# Patient Record
Sex: MALE | Race: WHITE | NOT HISPANIC OR LATINO | URBAN - METROPOLITAN AREA
[De-identification: names, ages, dates, MRNs, and addresses within clinical notes are randomized per-mention and may not be internally consistent; named-entity substitution may affect disease eponyms.]

---

## 2017-08-23 ENCOUNTER — EMERGENCY (EMERGENCY)
Facility: HOSPITAL | Age: 22
LOS: 1 days | Discharge: HOME | End: 2017-08-23
Attending: EMERGENCY MEDICINE | Admitting: EMERGENCY MEDICINE
Payer: COMMERCIAL

## 2017-08-23 VITALS
RESPIRATION RATE: 18 BRPM | HEART RATE: 59 BPM | TEMPERATURE: 98 F | SYSTOLIC BLOOD PRESSURE: 111 MMHG | OXYGEN SATURATION: 100 % | DIASTOLIC BLOOD PRESSURE: 67 MMHG

## 2017-08-23 DIAGNOSIS — K46.9 UNSPECIFIED ABDOMINAL HERNIA WITHOUT OBSTRUCTION OR GANGRENE: Chronic | ICD-10-CM

## 2017-08-23 LAB — S PYO AG SPEC QL IA: NEGATIVE — SIGNIFICANT CHANGE UP

## 2017-08-23 PROCEDURE — 99283 EMERGENCY DEPT VISIT LOW MDM: CPT | Mod: 25

## 2017-08-23 RX ORDER — DEXAMETHASONE 0.5 MG/5ML
10 ELIXIR ORAL ONCE
Qty: 0 | Refills: 0 | Status: COMPLETED | OUTPATIENT
Start: 2017-08-23 | End: 2017-08-23

## 2017-08-23 RX ORDER — ACETAMINOPHEN 500 MG
975 TABLET ORAL ONCE
Qty: 0 | Refills: 0 | Status: COMPLETED | OUTPATIENT
Start: 2017-08-23 | End: 2017-08-23

## 2017-08-23 NOTE — ED PROVIDER NOTE - CARE PLAN
Instructions for follow-up, activity and diet:	You were seen in the Emergency Department for abdominal pain and sore throat. Your examination and lab tests were reassuring. Take tylenol as needed for pain. Please return to the Emergency Department if you have any new concerning symptoms such as severe pain, weakness, shortness of breath or any other concerning symptoms. Principal Discharge DX:	Pharyngitis, unspecified etiology  Instructions for follow-up, activity and diet:	You were seen in the Emergency Department for abdominal pain and sore throat. Your examination and lab tests were reassuring. Take tylenol as needed for pain. Please return to the Emergency Department if you have any new concerning symptoms such as severe pain, weakness, shortness of breath or any other concerning symptoms.  Secondary Diagnosis:	Right lower quadrant abdominal pain

## 2017-08-23 NOTE — ED PROVIDER NOTE - MEDICAL DECISION MAKING DETAILS
20yo male p/w abdominal pain and sore throat since yesterday. Pt. reports pain is RLQ, nonradiating, intermittent, aggravated by coughing, no alleviating factors. Nontender, normal  exam. Will obtain rapid strep, provide tylenol and decadron. 20yo male p/w abdominal pain and sore throat since yesterday. Pt. reports pain is RLQ, nonradiating, intermittent, aggravated by coughing, no alleviating factors. Nontender, normal  exam. Will obtain rapid strep, provide tylenol and decadron.  Return precautions for acute appendicitis given to pt.  pt understands and will return if symptoms worsen

## 2017-08-23 NOTE — ED PROVIDER NOTE - PLAN OF CARE
You were seen in the Emergency Department for abdominal pain and sore throat. Your examination and lab tests were reassuring. Take tylenol as needed for pain. Please return to the Emergency Department if you have any new concerning symptoms such as severe pain, weakness, shortness of breath or any other concerning symptoms.

## 2017-08-23 NOTE — ED PROVIDER NOTE - OBJECTIVE STATEMENT
20yo male p/w abdominal pain and sore throat since yesterday. Pt. reports pain is RLQ, nonradiating, intermittent, aggravated by coughing, no alleviating factors. Pt. taking aleve w/ minimal relief. Pt. reports subjective fevers yesterday. Pt. reports sore throat since yesterday. Denies n/v/d, dysuria, hematuria, trauma. Pt. reports heavy lifting at work(works at [employer]). 22yo male p/w abdominal pain and sore throat since yesterday. Pt. reports pain is RLQ, nonradiating, intermittent, aggravated by coughing, no alleviating factors. Pt. taking aleve w/ minimal relief. Pt. reports subjective fevers yesterday. Pt. reports sore throat since yesterday. Denies n/v/d, dysuria, hematuria, trauma. Pt. reports heavy lifting at work(works at [employer]).    Attendinyo male presents with sore throat and RLQ pain for 2 days.  Pain is worse with movement sometimes but not always.  No nausea or vomiting.  No fever.  Tolerating PO well.

## 2017-08-24 PROCEDURE — 99283 EMERGENCY DEPT VISIT LOW MDM: CPT

## 2017-08-24 PROCEDURE — 87081 CULTURE SCREEN ONLY: CPT

## 2017-08-24 PROCEDURE — 87880 STREP A ASSAY W/OPTIC: CPT

## 2017-08-24 RX ADMIN — Medication 975 MILLIGRAM(S): at 00:06

## 2017-08-24 RX ADMIN — Medication 10 MILLIGRAM(S): at 00:06

## 2020-02-20 ENCOUNTER — APPOINTMENT (OUTPATIENT)
Dept: GASTROENTEROLOGY | Facility: CLINIC | Age: 25
End: 2020-02-20

## 2020-03-05 ENCOUNTER — APPOINTMENT (OUTPATIENT)
Dept: GASTROENTEROLOGY | Facility: CLINIC | Age: 25
End: 2020-03-05
Payer: COMMERCIAL

## 2020-03-05 VITALS
DIASTOLIC BLOOD PRESSURE: 68 MMHG | SYSTOLIC BLOOD PRESSURE: 108 MMHG | OXYGEN SATURATION: 98 % | WEIGHT: 168 LBS | TEMPERATURE: 98.4 F | BODY MASS INDEX: 23.52 KG/M2 | HEIGHT: 71 IN | HEART RATE: 80 BPM

## 2020-03-05 DIAGNOSIS — Z78.9 OTHER SPECIFIED HEALTH STATUS: ICD-10-CM

## 2020-03-05 DIAGNOSIS — Z87.19 PERSONAL HISTORY OF OTHER DISEASES OF THE DIGESTIVE SYSTEM: ICD-10-CM

## 2020-03-05 PROCEDURE — 99203 OFFICE O/P NEW LOW 30 MIN: CPT

## 2020-03-05 RX ORDER — IBUPROFEN 200 MG/1
TABLET, COATED ORAL
Refills: 0 | Status: ACTIVE | COMMUNITY

## 2020-03-05 RX ORDER — MULTIVITAMIN
TABLET ORAL
Refills: 0 | Status: ACTIVE | COMMUNITY

## 2020-03-05 NOTE — ASSESSMENT
[FreeTextEntry1] : Impression and plan\par \par Patient presents to the office today with irritable bowel type symptoms I will recommend further testing in the form of colonoscopy. The risks benefits alternatives and limitations were discussed. I asked the patient to maintain a food and symptom diary for review at next visit. Additionally I asked patient to perform a corn test for GI motility patient will update me as we go and schedule at his convenience.

## 2020-03-05 NOTE — HISTORY OF PRESENT ILLNESS
[FreeTextEntry1] : Patient came to the office today accompanied by his mother who is a nurse with complaints of irritable bowel type symptoms. Patient is a 24-year-old male who has been experiencing alternating constipation and diarrhea with crampy lower quadrant abdominal pain. He is a self-described type A personality who is very self motivated. He has a busy schedule and stressful deadlines ahead of him. He believes that this may contribute to his symptoms. Patient currently denies nausea vomiting fever chills rectal bleeding or melena. He does have alternating constipation and diarrhea and has been using MiraLax and fiber with partial relief.

## 2020-08-05 DIAGNOSIS — Z01.818 ENCOUNTER FOR OTHER PREPROCEDURAL EXAMINATION: ICD-10-CM

## 2020-08-05 DIAGNOSIS — Z11.59 ENCOUNTER FOR SCREENING FOR OTHER VIRAL DISEASES: ICD-10-CM

## 2020-08-08 LAB — SARS-COV-2 N GENE NPH QL NAA+PROBE: NOT DETECTED

## 2020-08-11 ENCOUNTER — APPOINTMENT (OUTPATIENT)
Dept: GASTROENTEROLOGY | Facility: AMBULATORY MEDICAL SERVICES | Age: 25
End: 2020-08-11
Payer: COMMERCIAL

## 2020-08-11 PROCEDURE — 45380 COLONOSCOPY AND BIOPSY: CPT

## 2020-08-11 PROCEDURE — 43239 EGD BIOPSY SINGLE/MULTIPLE: CPT

## 2020-12-21 ENCOUNTER — NON-APPOINTMENT (OUTPATIENT)
Age: 25
End: 2020-12-21

## 2020-12-21 ENCOUNTER — APPOINTMENT (OUTPATIENT)
Dept: INTERNAL MEDICINE | Facility: CLINIC | Age: 25
End: 2020-12-21
Payer: COMMERCIAL

## 2020-12-21 ENCOUNTER — LABORATORY RESULT (OUTPATIENT)
Age: 25
End: 2020-12-21

## 2020-12-21 VITALS
DIASTOLIC BLOOD PRESSURE: 74 MMHG | TEMPERATURE: 96.4 F | BODY MASS INDEX: 25.48 KG/M2 | WEIGHT: 178 LBS | HEIGHT: 70 IN | SYSTOLIC BLOOD PRESSURE: 113 MMHG | HEART RATE: 79 BPM

## 2020-12-21 PROCEDURE — 99385 PREV VISIT NEW AGE 18-39: CPT | Mod: 25

## 2020-12-21 PROCEDURE — 36415 COLL VENOUS BLD VENIPUNCTURE: CPT

## 2020-12-21 PROCEDURE — 93000 ELECTROCARDIOGRAM COMPLETE: CPT

## 2020-12-21 PROCEDURE — 99072 ADDL SUPL MATRL&STAF TM PHE: CPT

## 2020-12-21 NOTE — PHYSICAL EXAM
[Normal] : normal rate, regular rhythm, normal S1 and S2 and no murmur heard [No Varicosities] : no varicosities [Pedal Pulses Present] : the pedal pulses are present [No Edema] : there was no peripheral edema [No Extremity Clubbing/Cyanosis] : no extremity clubbing/cyanosis [Soft] : abdomen soft [Non Tender] : non-tender [Non-distended] : non-distended [No Masses] : no abdominal mass palpated [Normal Bowel Sounds] : normal bowel sounds [Normal Posterior Cervical Nodes] : no posterior cervical lymphadenopathy [Normal Anterior Cervical Nodes] : no anterior cervical lymphadenopathy [No CVA Tenderness] : no CVA  tenderness [No Spinal Tenderness] : no spinal tenderness [No Joint Swelling] : no joint swelling [Grossly Normal Strength/Tone] : grossly normal strength/tone [No Rash] : no rash [Coordination Grossly Intact] : coordination grossly intact [No Focal Deficits] : no focal deficits [Normal Gait] : normal gait [Normal Affect] : the affect was normal [Normal Mood] : the mood was normal

## 2020-12-21 NOTE — ASSESSMENT
[FreeTextEntry1] : 24 year old male presents for initial annual exam.\par \par h/o GERD on omeprazole since last year.  follows with GI,  s/p EGD and colonoscopy which were normal.  \par \par \par c/o decreased appetite, fatigue and decreased exercise tolerance for the last 2 weeks.  no fever/chills, n/v, sob, chest pain or palpitations.  with abnormal ecg, multiple diffuse twave inversions.  d/w cardio, Dr Donovan, consistent with pericarditis.  \par -will have cardio evaluation tomorrow and likely echo\par -may take some nsaids for now, may start colchicine if needed \par -will check labs including esr, crp and mono\par \par Annual \par -Advised to get yearly Flu shot -will hold off for now given possible underlying viral infection \par -Advised Yearly Eye exam with Ophthalmologist\par -Advised Yearly Dental exam\par -Educated of the importance of Healthy diet, such as Mediterranean Diet and Exercise, such as walking >20 minutes a day and increasing gradually as tolerated\par -Educated on the importance of limiting alcohol use to less than 5 drinks per week and refraining from tobacco and drug use.  discussed the need to use condoms to decrease risk of zachariah sexually transmitted disease\par \par \par \par \par

## 2020-12-21 NOTE — HISTORY OF PRESENT ILLNESS
[de-identified] : 24 year old male presents for initial annual exam.\par \par h/o GERD on omeprazole since last year.  follows with GI,  s/p EGD and colonoscopy which were normal.  \par \par c/o decreased appetite, fatigue and decreased exercise tolerance for the last 2 weeks.  no fever/chills, n/v, sob, chest pain or palpitations.  \par \par does occasionally have diarrhea and constipation from his IBS?.  no melena, abdominal pain\par \par \par

## 2020-12-21 NOTE — HEALTH RISK ASSESSMENT
[Good] : ~his/her~  mood as  good [No] : In the past 12 months have you used drugs other than those required for medical reasons? No [0] : 2) Feeling down, depressed, or hopeless: Not at all (0) [HIV Test offered] : HIV Test offered [Hepatitis C test offered] : Hepatitis C test offered [With Family] : lives with family [Employed] : employed [Single] : single [Sexually Active] : sexually active [Fully functional (bathing, dressing, toileting, transferring, walking, feeding)] : Fully functional (bathing, dressing, toileting, transferring, walking, feeding) [Fully functional (using the telephone, shopping, preparing meals, housekeeping, doing laundry, using] : Fully functional and needs no help or supervision to perform IADLs (using the telephone, shopping, preparing meals, housekeeping, doing laundry, using transportation, managing medications and managing finances) [] : No [AAQ1Iisfs] : 0 [Change in mental status noted] : No change in mental status noted [High Risk Behavior] : no high risk behavior [Reports changes in hearing] : Reports no changes in hearing [Reports changes in vision] : Reports no changes in vision [Reports changes in dental health] : Reports no changes in dental health [de-identified] : uses condoms

## 2020-12-22 ENCOUNTER — APPOINTMENT (OUTPATIENT)
Dept: CV DIAGNOSITCS | Facility: HOSPITAL | Age: 25
End: 2020-12-22

## 2020-12-22 ENCOUNTER — APPOINTMENT (OUTPATIENT)
Dept: CARDIOLOGY | Facility: CLINIC | Age: 25
End: 2020-12-22
Payer: COMMERCIAL

## 2020-12-22 ENCOUNTER — OUTPATIENT (OUTPATIENT)
Dept: OUTPATIENT SERVICES | Facility: HOSPITAL | Age: 25
LOS: 1 days | End: 2020-12-22
Payer: COMMERCIAL

## 2020-12-22 ENCOUNTER — NON-APPOINTMENT (OUTPATIENT)
Age: 25
End: 2020-12-22

## 2020-12-22 ENCOUNTER — APPOINTMENT (OUTPATIENT)
Dept: INTERNAL MEDICINE | Facility: CLINIC | Age: 25
End: 2020-12-22

## 2020-12-22 VITALS
SYSTOLIC BLOOD PRESSURE: 127 MMHG | HEART RATE: 77 BPM | WEIGHT: 181 LBS | TEMPERATURE: 97.2 F | HEIGHT: 70 IN | BODY MASS INDEX: 25.91 KG/M2 | OXYGEN SATURATION: 97 % | DIASTOLIC BLOOD PRESSURE: 77 MMHG | RESPIRATION RATE: 14 BRPM

## 2020-12-22 DIAGNOSIS — R53.83 OTHER MALAISE: ICD-10-CM

## 2020-12-22 DIAGNOSIS — K46.9 UNSPECIFIED ABDOMINAL HERNIA WITHOUT OBSTRUCTION OR GANGRENE: Chronic | ICD-10-CM

## 2020-12-22 DIAGNOSIS — R53.81 OTHER MALAISE: ICD-10-CM

## 2020-12-22 DIAGNOSIS — R94.31 ABNORMAL ELECTROCARDIOGRAM [ECG] [EKG]: ICD-10-CM

## 2020-12-22 PROCEDURE — 99205 OFFICE O/P NEW HI 60 MIN: CPT

## 2020-12-22 PROCEDURE — 93000 ELECTROCARDIOGRAM COMPLETE: CPT

## 2020-12-22 PROCEDURE — 93306 TTE W/DOPPLER COMPLETE: CPT | Mod: 26

## 2020-12-22 PROCEDURE — 99072 ADDL SUPL MATRL&STAF TM PHE: CPT

## 2020-12-23 LAB
25(OH)D3 SERPL-MCNC: 27 NG/ML
ALBUMIN SERPL ELPH-MCNC: 4.8 G/DL
ALP BLD-CCNC: 68 U/L
ALT SERPL-CCNC: 37 U/L
ANACR T: NEGATIVE
ANION GAP SERPL CALC-SCNC: 19 MMOL/L
APPEARANCE: CLEAR
AST SERPL-CCNC: 19 U/L
B BURGDOR IGG+IGM SER QL IB: NORMAL
BACTERIA: NEGATIVE
BASOPHILS # BLD AUTO: 0.05 K/UL
BASOPHILS NFR BLD AUTO: 0.8 %
BILIRUB DIRECT SERPL-MCNC: 0.1 MG/DL
BILIRUB INDIRECT SERPL-MCNC: NORMAL MG/DL
BILIRUB SERPL-MCNC: 0.2 MG/DL
BILIRUBIN URINE: NEGATIVE
BLOOD URINE: NEGATIVE
BUN SERPL-MCNC: 18 MG/DL
C TRACH RRNA SPEC QL NAA+PROBE: NOT DETECTED
CALCIUM SERPL-MCNC: 9.8 MG/DL
CHLORIDE SERPL-SCNC: 106 MMOL/L
CHOLEST SERPL-MCNC: 171 MG/DL
CO2 SERPL-SCNC: 17 MMOL/L
COLOR: NORMAL
CREAT SERPL-MCNC: 1.1 MG/DL
CRP SERPL-MCNC: 0.1 MG/DL
EOSINOPHIL # BLD AUTO: 0.29 K/UL
EOSINOPHIL NFR BLD AUTO: 4.6 %
ERYTHROCYTE [SEDIMENTATION RATE] IN BLOOD BY WESTERGREN METHOD: 7 MM/HR
ESTIMATED AVERAGE GLUCOSE: 103 MG/DL
FERRITIN SERPL-MCNC: 133 NG/ML
FOLATE SERPL-MCNC: 14.9 NG/ML
GLUCOSE QUALITATIVE U: NEGATIVE
GLUCOSE SERPL-MCNC: 93 MG/DL
HBA1C MFR BLD HPLC: 5.2 %
HBV CORE IGG+IGM SER QL: NONREACTIVE
HBV CORE IGM SER QL: NONREACTIVE
HBV SURFACE AB SER QL: NONREACTIVE
HBV SURFACE AG SER QL: NONREACTIVE
HCT VFR BLD CALC: 49.4 %
HCV AB SER QL: NONREACTIVE
HCV S/CO RATIO: 0.12 S/CO
HDLC SERPL-MCNC: 44 MG/DL
HETEROPH AB SER QL: NEGATIVE
HGB BLD-MCNC: 15.5 G/DL
HIV1+2 AB SPEC QL IA.RAPID: NONREACTIVE
HSV 1+2 IGG SER IA-IMP: NEGATIVE
HSV 1+2 IGG SER IA-IMP: NEGATIVE
HSV1 IGG SER QL: 0.23 INDEX
HSV2 IGG SER QL: 0.11 INDEX
HYALINE CASTS: 0 /LPF
IMM GRANULOCYTES NFR BLD AUTO: 0.2 %
IRON SATN MFR SERPL: 16 %
IRON SERPL-MCNC: 52 UG/DL
KETONES URINE: NEGATIVE
LDLC SERPL CALC-MCNC: 118 MG/DL
LEUKOCYTE ESTERASE URINE: NEGATIVE
LYMPHOCYTES # BLD AUTO: 2.05 K/UL
LYMPHOCYTES NFR BLD AUTO: 32.4 %
MAN DIFF?: NORMAL
MCHC RBC-ENTMCNC: 28 PG
MCHC RBC-ENTMCNC: 31.4 GM/DL
MCV RBC AUTO: 89.2 FL
MICROSCOPIC-UA: NORMAL
MONOCYTES # BLD AUTO: 0.57 K/UL
MONOCYTES NFR BLD AUTO: 9 %
N GONORRHOEA RRNA SPEC QL NAA+PROBE: NOT DETECTED
NEUTROPHILS # BLD AUTO: 3.35 K/UL
NEUTROPHILS NFR BLD AUTO: 53 %
NITRITE URINE: NEGATIVE
NONHDLC SERPL-MCNC: 127 MG/DL
PH URINE: 6.5
PLATELET # BLD AUTO: 185 K/UL
POTASSIUM SERPL-SCNC: 4.3 MMOL/L
PROT SERPL-MCNC: 6.8 G/DL
PROTEIN URINE: NEGATIVE
RBC # BLD: 5.54 M/UL
RBC # FLD: 14 %
RED BLOOD CELLS URINE: 0 /HPF
SARS-COV-2 IGG SERPL IA-ACNC: 0.06 INDEX
SARS-COV-2 IGG SERPL QL IA: NEGATIVE
SAVE SPECIMEN: NORMAL
SODIUM SERPL-SCNC: 142 MMOL/L
SOURCE AMPLIFICATION: NORMAL
SPECIFIC GRAVITY URINE: 1.03
SQUAMOUS EPITHELIAL CELLS: 0 /HPF
T PALLIDUM AB SER QL IA: NEGATIVE
TIBC SERPL-MCNC: 330 UG/DL
TRIGL SERPL-MCNC: 46 MG/DL
TSH SERPL-ACNC: 2.04 UIU/ML
UIBC SERPL-MCNC: 277 UG/DL
URATE SERPL-MCNC: 5.4 MG/DL
UROBILINOGEN URINE: NORMAL
VIT B12 SERPL-MCNC: 469 PG/ML
WBC # FLD AUTO: 6.32 K/UL
WHITE BLOOD CELLS URINE: 0 /HPF

## 2020-12-27 PROBLEM — R53.81 MALAISE AND FATIGUE: Status: ACTIVE | Noted: 2020-12-21

## 2021-01-19 ENCOUNTER — OUTPATIENT (OUTPATIENT)
Dept: OUTPATIENT SERVICES | Facility: HOSPITAL | Age: 26
LOS: 1 days | End: 2021-01-19
Payer: COMMERCIAL

## 2021-01-19 ENCOUNTER — APPOINTMENT (OUTPATIENT)
Dept: CARDIOLOGY | Facility: CLINIC | Age: 26
End: 2021-01-19

## 2021-01-19 DIAGNOSIS — R94.31 ABNORMAL ELECTROCARDIOGRAM [ECG] [EKG]: ICD-10-CM

## 2021-01-19 DIAGNOSIS — I51.9 HEART DISEASE, UNSPECIFIED: ICD-10-CM

## 2021-01-19 DIAGNOSIS — R07.9 CHEST PAIN, UNSPECIFIED: ICD-10-CM

## 2021-01-19 DIAGNOSIS — K46.9 UNSPECIFIED ABDOMINAL HERNIA WITHOUT OBSTRUCTION OR GANGRENE: Chronic | ICD-10-CM

## 2021-01-19 PROCEDURE — 75574 CT ANGIO HRT W/3D IMAGE: CPT | Mod: 26

## 2021-01-19 PROCEDURE — 75574 CT ANGIO HRT W/3D IMAGE: CPT

## 2021-03-24 ENCOUNTER — APPOINTMENT (OUTPATIENT)
Dept: CARDIOLOGY | Facility: CLINIC | Age: 26
End: 2021-03-24
Payer: COMMERCIAL

## 2021-03-24 VITALS
HEIGHT: 71 IN | RESPIRATION RATE: 16 BRPM | SYSTOLIC BLOOD PRESSURE: 112 MMHG | HEART RATE: 90 BPM | DIASTOLIC BLOOD PRESSURE: 72 MMHG | OXYGEN SATURATION: 97 % | BODY MASS INDEX: 25.48 KG/M2 | TEMPERATURE: 98 F | WEIGHT: 182 LBS

## 2021-03-24 PROCEDURE — 93000 ELECTROCARDIOGRAM COMPLETE: CPT

## 2021-03-24 PROCEDURE — 99214 OFFICE O/P EST MOD 30 MIN: CPT

## 2021-03-24 PROCEDURE — 99072 ADDL SUPL MATRL&STAF TM PHE: CPT

## 2021-03-30 ENCOUNTER — OUTPATIENT (OUTPATIENT)
Dept: OUTPATIENT SERVICES | Facility: HOSPITAL | Age: 26
LOS: 1 days | End: 2021-03-30

## 2021-03-30 ENCOUNTER — APPOINTMENT (OUTPATIENT)
Dept: CV DIAGNOSITCS | Facility: HOSPITAL | Age: 26
End: 2021-03-30
Payer: COMMERCIAL

## 2021-03-30 DIAGNOSIS — R94.31 ABNORMAL ELECTROCARDIOGRAM [ECG] [EKG]: ICD-10-CM

## 2021-03-30 DIAGNOSIS — K46.9 UNSPECIFIED ABDOMINAL HERNIA WITHOUT OBSTRUCTION OR GANGRENE: Chronic | ICD-10-CM

## 2021-03-30 PROCEDURE — 93306 TTE W/DOPPLER COMPLETE: CPT | Mod: 26

## 2021-08-12 ENCOUNTER — EMERGENCY (EMERGENCY)
Facility: HOSPITAL | Age: 26
LOS: 1 days | Discharge: HOME | End: 2021-08-12
Attending: EMERGENCY MEDICINE
Payer: COMMERCIAL

## 2021-08-12 VITALS
SYSTOLIC BLOOD PRESSURE: 121 MMHG | OXYGEN SATURATION: 95 % | HEIGHT: 71 IN | DIASTOLIC BLOOD PRESSURE: 80 MMHG | RESPIRATION RATE: 18 BRPM | TEMPERATURE: 98 F | HEART RATE: 98 BPM | WEIGHT: 175.05 LBS

## 2021-08-12 DIAGNOSIS — K46.9 UNSPECIFIED ABDOMINAL HERNIA WITHOUT OBSTRUCTION OR GANGRENE: Chronic | ICD-10-CM

## 2021-08-12 LAB
ALBUMIN SERPL ELPH-MCNC: 4.8 G/DL — SIGNIFICANT CHANGE UP (ref 3.3–5)
ALP SERPL-CCNC: 63 U/L — SIGNIFICANT CHANGE UP (ref 40–120)
ALT FLD-CCNC: 33 U/L — SIGNIFICANT CHANGE UP (ref 10–45)
ANION GAP SERPL CALC-SCNC: 14 MMOL/L — SIGNIFICANT CHANGE UP (ref 5–17)
APPEARANCE UR: CLEAR — SIGNIFICANT CHANGE UP
AST SERPL-CCNC: 21 U/L — SIGNIFICANT CHANGE UP (ref 10–40)
BILIRUB SERPL-MCNC: 0.5 MG/DL — SIGNIFICANT CHANGE UP (ref 0.2–1.2)
BILIRUB UR-MCNC: NEGATIVE — SIGNIFICANT CHANGE UP
BUN SERPL-MCNC: 15 MG/DL — SIGNIFICANT CHANGE UP (ref 7–23)
CALCIUM SERPL-MCNC: 10.6 MG/DL — HIGH (ref 8.4–10.5)
CHLORIDE SERPL-SCNC: 104 MMOL/L — SIGNIFICANT CHANGE UP (ref 96–108)
CO2 SERPL-SCNC: 23 MMOL/L — SIGNIFICANT CHANGE UP (ref 22–31)
COLOR SPEC: YELLOW — SIGNIFICANT CHANGE UP
CREAT SERPL-MCNC: 0.97 MG/DL — SIGNIFICANT CHANGE UP (ref 0.5–1.3)
DIFF PNL FLD: NEGATIVE — SIGNIFICANT CHANGE UP
GLUCOSE SERPL-MCNC: 93 MG/DL — SIGNIFICANT CHANGE UP (ref 70–99)
GLUCOSE UR QL: NEGATIVE — SIGNIFICANT CHANGE UP
HCT VFR BLD CALC: 49 % — SIGNIFICANT CHANGE UP (ref 39–50)
HGB BLD-MCNC: 16.2 G/DL — SIGNIFICANT CHANGE UP (ref 13–17)
KETONES UR-MCNC: NEGATIVE — SIGNIFICANT CHANGE UP
LEUKOCYTE ESTERASE UR-ACNC: NEGATIVE — SIGNIFICANT CHANGE UP
LIDOCAIN IGE QN: 25 U/L — SIGNIFICANT CHANGE UP (ref 7–60)
MCHC RBC-ENTMCNC: 28.2 PG — SIGNIFICANT CHANGE UP (ref 27–34)
MCHC RBC-ENTMCNC: 33.1 GM/DL — SIGNIFICANT CHANGE UP (ref 32–36)
MCV RBC AUTO: 85.2 FL — SIGNIFICANT CHANGE UP (ref 80–100)
NITRITE UR-MCNC: NEGATIVE — SIGNIFICANT CHANGE UP
NRBC # BLD: 0 /100 WBCS — SIGNIFICANT CHANGE UP (ref 0–0)
PH UR: 6.5 — SIGNIFICANT CHANGE UP (ref 5–8)
PLATELET # BLD AUTO: 215 K/UL — SIGNIFICANT CHANGE UP (ref 150–400)
POTASSIUM SERPL-MCNC: 4 MMOL/L — SIGNIFICANT CHANGE UP (ref 3.5–5.3)
POTASSIUM SERPL-SCNC: 4 MMOL/L — SIGNIFICANT CHANGE UP (ref 3.5–5.3)
PROT SERPL-MCNC: 7.7 G/DL — SIGNIFICANT CHANGE UP (ref 6–8.3)
PROT UR-MCNC: SIGNIFICANT CHANGE UP
RBC # BLD: 5.75 M/UL — SIGNIFICANT CHANGE UP (ref 4.2–5.8)
RBC # FLD: 13.4 % — SIGNIFICANT CHANGE UP (ref 10.3–14.5)
SODIUM SERPL-SCNC: 141 MMOL/L — SIGNIFICANT CHANGE UP (ref 135–145)
SP GR SPEC: 1.03 — HIGH (ref 1.01–1.02)
UROBILINOGEN FLD QL: NEGATIVE — SIGNIFICANT CHANGE UP
WBC # BLD: 13.86 K/UL — HIGH (ref 3.8–10.5)
WBC # FLD AUTO: 13.86 K/UL — HIGH (ref 3.8–10.5)

## 2021-08-12 PROCEDURE — 99284 EMERGENCY DEPT VISIT MOD MDM: CPT

## 2021-08-12 PROCEDURE — 81003 URINALYSIS AUTO W/O SCOPE: CPT

## 2021-08-12 PROCEDURE — 85027 COMPLETE CBC AUTOMATED: CPT

## 2021-08-12 PROCEDURE — 83690 ASSAY OF LIPASE: CPT

## 2021-08-12 PROCEDURE — 99284 EMERGENCY DEPT VISIT MOD MDM: CPT | Mod: 25

## 2021-08-12 PROCEDURE — 96375 TX/PRO/DX INJ NEW DRUG ADDON: CPT

## 2021-08-12 PROCEDURE — 96361 HYDRATE IV INFUSION ADD-ON: CPT

## 2021-08-12 PROCEDURE — 96374 THER/PROPH/DIAG INJ IV PUSH: CPT

## 2021-08-12 PROCEDURE — 80053 COMPREHEN METABOLIC PANEL: CPT

## 2021-08-12 RX ORDER — PANTOPRAZOLE SODIUM 20 MG/1
40 TABLET, DELAYED RELEASE ORAL ONCE
Refills: 0 | Status: COMPLETED | OUTPATIENT
Start: 2021-08-12 | End: 2021-08-12

## 2021-08-12 RX ORDER — FAMOTIDINE 10 MG/ML
20 INJECTION INTRAVENOUS ONCE
Refills: 0 | Status: COMPLETED | OUTPATIENT
Start: 2021-08-12 | End: 2021-08-12

## 2021-08-12 RX ORDER — SODIUM CHLORIDE 9 MG/ML
1000 INJECTION INTRAMUSCULAR; INTRAVENOUS; SUBCUTANEOUS ONCE
Refills: 0 | Status: COMPLETED | OUTPATIENT
Start: 2021-08-12 | End: 2021-08-12

## 2021-08-12 RX ORDER — ONDANSETRON 8 MG/1
4 TABLET, FILM COATED ORAL ONCE
Refills: 0 | Status: COMPLETED | OUTPATIENT
Start: 2021-08-12 | End: 2021-08-12

## 2021-08-12 RX ORDER — ACETAMINOPHEN 500 MG
650 TABLET ORAL ONCE
Refills: 0 | Status: COMPLETED | OUTPATIENT
Start: 2021-08-12 | End: 2021-08-12

## 2021-08-12 RX ADMIN — SODIUM CHLORIDE 1000 MILLILITER(S): 9 INJECTION INTRAMUSCULAR; INTRAVENOUS; SUBCUTANEOUS at 14:01

## 2021-08-12 RX ADMIN — SODIUM CHLORIDE 1000 MILLILITER(S): 9 INJECTION INTRAMUSCULAR; INTRAVENOUS; SUBCUTANEOUS at 15:14

## 2021-08-12 RX ADMIN — FAMOTIDINE 20 MILLIGRAM(S): 10 INJECTION INTRAVENOUS at 13:58

## 2021-08-12 RX ADMIN — SODIUM CHLORIDE 1000 MILLILITER(S): 9 INJECTION INTRAMUSCULAR; INTRAVENOUS; SUBCUTANEOUS at 15:07

## 2021-08-12 RX ADMIN — Medication 650 MILLIGRAM(S): at 15:13

## 2021-08-12 RX ADMIN — Medication 30 MILLILITER(S): at 13:59

## 2021-08-12 RX ADMIN — PANTOPRAZOLE SODIUM 40 MILLIGRAM(S): 20 TABLET, DELAYED RELEASE ORAL at 13:59

## 2021-08-12 RX ADMIN — ONDANSETRON 4 MILLIGRAM(S): 8 TABLET, FILM COATED ORAL at 13:59

## 2021-08-12 NOTE — ED PROVIDER NOTE - NS ED ROS FT
CONST: no fevers, no chills, no trauma  CV: no chest pain, no palpitations, no orthopnea, no extremity pain or swelling  RESP: no shortness of breath, no cough, no sputum, no pleurisy, no wheezing  : no dysuria, no hematuria, no frequency, no urgency  MSK: no back pain, no neck pain, no extremity pain  NEURO: no headache, no sensory disturbances, no focal weakness, no dizziness  HEME: no easy bleeding or bruising  SKIN: no diaphoresis, no rash

## 2021-08-12 NOTE — ED PROVIDER NOTE - PHYSICAL EXAMINATION
xiao   aaox3 nad ncat perrl   no sceral icterus   s1s2 rrr  ctabl   soft nt nd no hernia   no cvat, no testicular ttp =   no rash   cn2-12intact

## 2021-08-12 NOTE — ED PROVIDER NOTE - CLINICAL SUMMARY MEDICAL DECISION MAKING FREE TEXT BOX
xiao 25 male w ho gastritis presents with crampy abd pain x 1 day worse luq - n/v x 1 nonbilious mucous loose stool - abd benign no rebound no guarding no hernia no test ttp= nml lie -- pt w ho of gastritis -- ck lipase and lfts , gi cocktail;, no imaging at this time

## 2021-08-12 NOTE — ED ADULT NURSE NOTE - OBJECTIVE STATEMENT
25 yr old male with past h/o pericarditis and recovered also with h/o gi issues with endoscopy and colonoscopy complete just shows stomach lining is effected. ate lunch yesterday but no dinner as he started to feel nauseous. has diarrhea this am. states it is on the left side mostly but is everywhere. otherwise healthy. on assessment a and o x 3 lungs clear abd soft non tender no swelling in extremities no some nausea no vomiting and yes to diarhea. no other medical issues.

## 2021-08-12 NOTE — ED PROVIDER NOTE - OBJECTIVE STATEMENT
xiao 25 m with ho chronic esophagitis gastritis and ibs with acute abd pain crampy with nausea and vomting x1 and mucous stool - no sick contacts no fever no ho IBD with colonoscopy and endoscopy last year supposed to take ppi but not compliant -- no urinary s/s no cp no sob - no prev abd surg  pain is moderate not relieved by home meds xiao 25 m with ho chronic esophagitis gastritis and ibs with acute abd pain crampy with nausea and vomting x1 and mucous stool - no sick contacts no fever no ho IBD with colonoscopy and endoscopy last year supposed to take ppi but not compliant -- no urinary s/s no cp no sob - no prev abd surg  pain is moderate not relieved by home meds  Sexually active with 1 partner, inconsistent condom use. Denies any testicular pain, swelling. xiao 25 m with ho chronic esophagitis gastritis and ibs with acute abd pain crampy with nausea and vomiting x1 and mucous stool - no sick contacts no fever no ho IBD with colonoscopy and endoscopy last year supposed to take ppi but not compliant -- no urinary s/s no cp no sob - no prev abd surg  pain is moderate not relieved by home meds  Sexually active with 1 partner, inconsistent condom use. Denies any testicular pain, swelling.

## 2021-08-12 NOTE — ED PROVIDER NOTE - NSFOLLOWUPINSTRUCTIONS_ED_ALL_ED_FT
follow up with your gastroenterologist call today - bland food plenty of fluids return with worsening pain or persistent vomiting  pt given appendix precautions

## 2021-08-12 NOTE — ED PROVIDER NOTE - PROGRESS NOTE DETAILS
Rechecked pt. Updated on labs. Pt still reporting pain and has diffuse tenderness to palpation on exam. Will order Tylenol and another 1L NS. pt reeval feeling much better reexam benign -- appy precautions given and pt will fu with gi

## 2021-08-12 NOTE — ED PROVIDER NOTE - PATIENT PORTAL LINK FT
You can access the FollowMyHealth Patient Portal offered by [facility] by registering at the following website: [email or web address]. By joining [facility]’s FollowMyHealth portal, you will also be able to view your health information using other applications (apps) compatible with our system.

## 2021-08-31 ENCOUNTER — APPOINTMENT (OUTPATIENT)
Dept: GASTROENTEROLOGY | Facility: CLINIC | Age: 26
End: 2021-08-31
Payer: COMMERCIAL

## 2021-08-31 VITALS
HEART RATE: 96 BPM | DIASTOLIC BLOOD PRESSURE: 72 MMHG | BODY MASS INDEX: 26.18 KG/M2 | OXYGEN SATURATION: 98 % | TEMPERATURE: 95.7 F | WEIGHT: 193.25 LBS | SYSTOLIC BLOOD PRESSURE: 120 MMHG | HEIGHT: 72 IN

## 2021-08-31 DIAGNOSIS — K58.2 MIXED IRRITABLE BOWEL SYNDROME: ICD-10-CM

## 2021-08-31 PROCEDURE — 99213 OFFICE O/P EST LOW 20 MIN: CPT

## 2021-08-31 NOTE — ASSESSMENT
[FreeTextEntry1] : Impression and plan\par \par Patient came to the office today to discuss irritable bowel type symptomatology. His main complaint seems to be rather gassy bloating and constipation but he has occasional heartburn as well. I suggested he try H2 blocker b.i.d. dosing for heartburn relief and to use occasional MiraLax over-the-counter for constipation. The patient will do a corn test for me and maintain a food and symptom journal. He will get back to me by phone and we will discuss further what if any testing may be required.

## 2021-08-31 NOTE — HISTORY OF PRESENT ILLNESS
[FreeTextEntry1] : Patient has been complaining of intermittent abdominal complaints including alternating constipation and loose stools dyspepsia with heartburn and occasional decreased appetite. The patient denies associated vomiting fever diarrhea or melena. There has been no bleeding.

## 2021-10-19 ENCOUNTER — APPOINTMENT (OUTPATIENT)
Dept: GASTROENTEROLOGY | Facility: CLINIC | Age: 26
End: 2021-10-19

## 2021-12-06 ENCOUNTER — NON-APPOINTMENT (OUTPATIENT)
Age: 26
End: 2021-12-06

## 2021-12-06 ENCOUNTER — APPOINTMENT (OUTPATIENT)
Dept: INTERNAL MEDICINE | Facility: CLINIC | Age: 26
End: 2021-12-06
Payer: COMMERCIAL

## 2021-12-06 VITALS
HEART RATE: 81 BPM | DIASTOLIC BLOOD PRESSURE: 76 MMHG | BODY MASS INDEX: 25.92 KG/M2 | WEIGHT: 191.38 LBS | OXYGEN SATURATION: 98 % | SYSTOLIC BLOOD PRESSURE: 119 MMHG | HEIGHT: 72 IN

## 2021-12-06 DIAGNOSIS — Z86.79 PERSONAL HISTORY OF OTHER DISEASES OF THE CIRCULATORY SYSTEM: ICD-10-CM

## 2021-12-06 DIAGNOSIS — Z23 ENCOUNTER FOR IMMUNIZATION: ICD-10-CM

## 2021-12-06 DIAGNOSIS — K21.9 GASTRO-ESOPHAGEAL REFLUX DISEASE W/OUT ESOPHAGITIS: ICD-10-CM

## 2021-12-06 PROCEDURE — G0008: CPT

## 2021-12-06 PROCEDURE — 93000 ELECTROCARDIOGRAM COMPLETE: CPT

## 2021-12-06 PROCEDURE — 99395 PREV VISIT EST AGE 18-39: CPT | Mod: 25

## 2021-12-06 PROCEDURE — 90686 IIV4 VACC NO PRSV 0.5 ML IM: CPT

## 2021-12-06 RX ORDER — FAMOTIDINE 20 MG/1
20 TABLET, FILM COATED ORAL
Qty: 180 | Refills: 3 | Status: DISCONTINUED | COMMUNITY
Start: 2021-08-31 | End: 2021-12-06

## 2021-12-06 RX ORDER — POLYETHYLENE GLYCOL-3350, SODIUM CHLORIDE, POTASSIUM CHLORIDE AND SODIUM BICARBONATE 420; 11.2; 5.72; 1.48 G/438.4G; G/438.4G; G/438.4G; G/438.4G
420 POWDER, FOR SOLUTION ORAL
Qty: 1 | Refills: 0 | Status: DISCONTINUED | COMMUNITY
Start: 2020-08-05 | End: 2021-12-06

## 2021-12-06 RX ORDER — LORATADINE 5 MG
17 TABLET,CHEWABLE ORAL
Refills: 0 | Status: DISCONTINUED | COMMUNITY
End: 2021-12-06

## 2021-12-06 RX ORDER — COLCHICINE 0.6 MG/1
0.6 TABLET ORAL
Qty: 90 | Refills: 3 | Status: DISCONTINUED | COMMUNITY
Start: 2020-12-28 | End: 2021-12-06

## 2021-12-06 NOTE — HISTORY OF PRESENT ILLNESS
[de-identified] : 25 year old male presents for annual exam.\par \par h/o GERD on omeprazole since last year.  follows with GI,  s/p EGD and colonoscopy which were normal.  \par \par does occasionally have diarrhea and constipation from his IBS, much better over the last several months.  \par \par \par \par \par \par \par

## 2021-12-06 NOTE — ASSESSMENT
[FreeTextEntry1] : 25 year old male presents for annual exam.\par \par h/o GERD on omeprazole since last year.  follows with GI,  s/p EGD and colonoscopy which were normal.  -better \par -cont diet modifications \par \par \par Annual \par -Advised to get yearly Flu shot -given today \par -Advised Yearly Eye exam with Ophthalmologist\par -Advised Yearly Dental exam\par -Educated of the importance of Healthy diet, such as Mediterranean Diet and Exercise, such as walking >20 minutes a day and increasing gradually as tolerated\par -Educated on the importance of limiting alcohol use to less than 5 drinks per week and refraining from tobacco and drug use.  discussed the need to use condoms to decrease risk of zachariah sexually transmitted disease\par \par -covid -up to date \par \par \par \par \par \par \par

## 2021-12-06 NOTE — HEALTH RISK ASSESSMENT
[Good] : ~his/her~  mood as  good [No] : No [No falls in past year] : Patient reported no falls in the past year [0] : 2) Feeling down, depressed, or hopeless: Not at all (0) [] : No [[id number]] : 0 [Change in mental status noted] : No change in mental status noted

## 2021-12-07 ENCOUNTER — CLINICAL ADVICE (OUTPATIENT)
Age: 26
End: 2021-12-07

## 2021-12-07 LAB
25(OH)D3 SERPL-MCNC: 14.7 NG/ML
ALBUMIN SERPL ELPH-MCNC: 4.9 G/DL
ALP BLD-CCNC: 69 U/L
ALT SERPL-CCNC: 35 U/L
ANION GAP SERPL CALC-SCNC: 16 MMOL/L
APPEARANCE: CLEAR
AST SERPL-CCNC: 30 U/L
BACTERIA: NEGATIVE
BASOPHILS # BLD AUTO: 0.06 K/UL
BASOPHILS NFR BLD AUTO: 0.8 %
BILIRUB DIRECT SERPL-MCNC: 0.1 MG/DL
BILIRUB INDIRECT SERPL-MCNC: 0.3 MG/DL
BILIRUB SERPL-MCNC: 0.4 MG/DL
BILIRUBIN URINE: NEGATIVE
BLOOD URINE: NEGATIVE
BUN SERPL-MCNC: 13 MG/DL
C TRACH RRNA SPEC QL NAA+PROBE: NOT DETECTED
CALCIUM SERPL-MCNC: 9.5 MG/DL
CHLORIDE SERPL-SCNC: 103 MMOL/L
CHOLEST SERPL-MCNC: 196 MG/DL
CO2 SERPL-SCNC: 22 MMOL/L
COLOR: YELLOW
CREAT SERPL-MCNC: 1.04 MG/DL
EOSINOPHIL # BLD AUTO: 0.49 K/UL
EOSINOPHIL NFR BLD AUTO: 6.1 %
ESTIMATED AVERAGE GLUCOSE: 105 MG/DL
FERRITIN SERPL-MCNC: 164 NG/ML
FOLATE SERPL-MCNC: 9.1 NG/ML
GLUCOSE QUALITATIVE U: NEGATIVE
GLUCOSE SERPL-MCNC: 89 MG/DL
HBA1C MFR BLD HPLC: 5.3 %
HBV SURFACE AB SER QL: NONREACTIVE
HBV SURFACE AG SER QL: NONREACTIVE
HCT VFR BLD CALC: 46.8 %
HCV AB SER QL: NONREACTIVE
HCV S/CO RATIO: 0.18 S/CO
HDLC SERPL-MCNC: 35 MG/DL
HGB BLD-MCNC: 15 G/DL
HIV1+2 AB SPEC QL IA.RAPID: NONREACTIVE
HYALINE CASTS: 0 /LPF
IMM GRANULOCYTES NFR BLD AUTO: 0.3 %
IRON SATN MFR SERPL: 40 %
IRON SERPL-MCNC: 131 UG/DL
KETONES URINE: NEGATIVE
LDLC SERPL CALC-MCNC: 95 MG/DL
LEUKOCYTE ESTERASE URINE: NEGATIVE
LYMPHOCYTES # BLD AUTO: 3.09 K/UL
LYMPHOCYTES NFR BLD AUTO: 38.7 %
MAN DIFF?: NORMAL
MCHC RBC-ENTMCNC: 27.7 PG
MCHC RBC-ENTMCNC: 32.1 GM/DL
MCV RBC AUTO: 86.3 FL
MICROSCOPIC-UA: NORMAL
MONOCYTES # BLD AUTO: 0.81 K/UL
MONOCYTES NFR BLD AUTO: 10.1 %
N GONORRHOEA RRNA SPEC QL NAA+PROBE: NOT DETECTED
NEUTROPHILS # BLD AUTO: 3.52 K/UL
NEUTROPHILS NFR BLD AUTO: 44 %
NITRITE URINE: NEGATIVE
NONHDLC SERPL-MCNC: 162 MG/DL
PH URINE: 7
PLATELET # BLD AUTO: 221 K/UL
POTASSIUM SERPL-SCNC: 3.9 MMOL/L
PROT SERPL-MCNC: 7 G/DL
PROTEIN URINE: ABNORMAL
RBC # BLD: 5.42 M/UL
RBC # FLD: 14.1 %
RED BLOOD CELLS URINE: 1 /HPF
SODIUM SERPL-SCNC: 140 MMOL/L
SOURCE AMPLIFICATION: NORMAL
SPECIFIC GRAVITY URINE: 1.04
SQUAMOUS EPITHELIAL CELLS: 0 /HPF
T PALLIDUM AB SER QL IA: NEGATIVE
TIBC SERPL-MCNC: 327 UG/DL
TRIGL SERPL-MCNC: 333 MG/DL
TSH SERPL-ACNC: 2.04 UIU/ML
UIBC SERPL-MCNC: 196 UG/DL
UROBILINOGEN URINE: NORMAL
VIT B12 SERPL-MCNC: 386 PG/ML
WBC # FLD AUTO: 7.99 K/UL
WHITE BLOOD CELLS URINE: 0 /HPF

## 2022-05-24 ENCOUNTER — NON-APPOINTMENT (OUTPATIENT)
Age: 27
End: 2022-05-24

## 2022-06-22 ENCOUNTER — APPOINTMENT (OUTPATIENT)
Dept: CARDIOLOGY | Facility: CLINIC | Age: 27
End: 2022-06-22
Payer: COMMERCIAL

## 2022-06-22 ENCOUNTER — NON-APPOINTMENT (OUTPATIENT)
Age: 27
End: 2022-06-22

## 2022-06-22 VITALS
OXYGEN SATURATION: 98 % | BODY MASS INDEX: 26.18 KG/M2 | HEART RATE: 73 BPM | SYSTOLIC BLOOD PRESSURE: 118 MMHG | HEIGHT: 72 IN | DIASTOLIC BLOOD PRESSURE: 79 MMHG | TEMPERATURE: 98.1 F

## 2022-06-22 VITALS — WEIGHT: 193 LBS

## 2022-06-22 DIAGNOSIS — Z13.6 ENCOUNTER FOR SCREENING FOR CARDIOVASCULAR DISORDERS: ICD-10-CM

## 2022-06-22 DIAGNOSIS — I51.9 HEART DISEASE, UNSPECIFIED: ICD-10-CM

## 2022-06-22 DIAGNOSIS — R94.31 ABNORMAL ELECTROCARDIOGRAM [ECG] [EKG]: ICD-10-CM

## 2022-06-22 PROCEDURE — 93000 ELECTROCARDIOGRAM COMPLETE: CPT

## 2022-06-22 PROCEDURE — 99214 OFFICE O/P EST MOD 30 MIN: CPT

## 2022-06-23 LAB
ALBUMIN SERPL ELPH-MCNC: 5 G/DL
ALP BLD-CCNC: 76 U/L
ALT SERPL-CCNC: 29 U/L
ANA PAT FLD IF-IMP: ABNORMAL
ANA SER IF-ACNC: ABNORMAL
ANION GAP SERPL CALC-SCNC: 13 MMOL/L
AST SERPL-CCNC: 22 U/L
BASOPHILS # BLD AUTO: 0.06 K/UL
BASOPHILS NFR BLD AUTO: 0.9 %
BILIRUB SERPL-MCNC: 0.3 MG/DL
BUN SERPL-MCNC: 14 MG/DL
CALCIUM SERPL-MCNC: 9.9 MG/DL
CHLORIDE SERPL-SCNC: 104 MMOL/L
CHOLEST SERPL-MCNC: 218 MG/DL
CO2 SERPL-SCNC: 25 MMOL/L
CREAT SERPL-MCNC: 1.04 MG/DL
CRP SERPL-MCNC: <3 MG/L
EGFR: 103 ML/MIN/1.73M2
EOSINOPHIL # BLD AUTO: 0.28 K/UL
EOSINOPHIL NFR BLD AUTO: 4.2 %
ERYTHROCYTE [SEDIMENTATION RATE] IN BLOOD BY WESTERGREN METHOD: 10 MM/HR
ESTIMATED AVERAGE GLUCOSE: 108 MG/DL
GLUCOSE SERPL-MCNC: 73 MG/DL
HBA1C MFR BLD HPLC: 5.4 %
HCT VFR BLD CALC: 49.6 %
HDLC SERPL-MCNC: 47 MG/DL
HGB BLD-MCNC: 16 G/DL
IMM GRANULOCYTES NFR BLD AUTO: 0.5 %
LDLC SERPL CALC-MCNC: 158 MG/DL
LYMPHOCYTES # BLD AUTO: 2.56 K/UL
LYMPHOCYTES NFR BLD AUTO: 38.5 %
MAN DIFF?: NORMAL
MCHC RBC-ENTMCNC: 27.5 PG
MCHC RBC-ENTMCNC: 32.3 GM/DL
MCV RBC AUTO: 85.4 FL
MONOCYTES # BLD AUTO: 0.54 K/UL
MONOCYTES NFR BLD AUTO: 8.1 %
NEUTROPHILS # BLD AUTO: 3.18 K/UL
NEUTROPHILS NFR BLD AUTO: 47.8 %
NONHDLC SERPL-MCNC: 170 MG/DL
PLATELET # BLD AUTO: 223 K/UL
POTASSIUM SERPL-SCNC: 4.3 MMOL/L
PROT SERPL-MCNC: 7.8 G/DL
RBC # BLD: 5.81 M/UL
RBC # FLD: 14 %
RHEUMATOID FACT SER QL: <10 IU/ML
SODIUM SERPL-SCNC: 141 MMOL/L
TRIGL SERPL-MCNC: 64 MG/DL
TSH SERPL-ACNC: 3.08 UIU/ML
WBC # FLD AUTO: 6.65 K/UL

## 2022-06-25 PROBLEM — R94.31 ST SEGMENT CHANGES ON ELECTROCARDIOGRAM: Status: ACTIVE | Noted: 2020-12-27

## 2022-09-12 ENCOUNTER — APPOINTMENT (OUTPATIENT)
Dept: ORTHOPEDIC SURGERY | Facility: CLINIC | Age: 27
End: 2022-09-12

## 2022-09-12 ENCOUNTER — NON-APPOINTMENT (OUTPATIENT)
Age: 27
End: 2022-09-12

## 2022-09-12 VITALS
OXYGEN SATURATION: 98 % | HEART RATE: 74 BPM | DIASTOLIC BLOOD PRESSURE: 70 MMHG | SYSTOLIC BLOOD PRESSURE: 108 MMHG | BODY MASS INDEX: 25.2 KG/M2 | HEIGHT: 71 IN | WEIGHT: 180 LBS

## 2022-09-12 DIAGNOSIS — M65.9 SYNOVITIS AND TENOSYNOVITIS, UNSPECIFIED: ICD-10-CM

## 2022-09-12 DIAGNOSIS — M25.859 OTHER SPECIFIED JOINT DISORDERS, UNSPECIFIED HIP: ICD-10-CM

## 2022-09-12 DIAGNOSIS — M21.42 FLAT FOOT [PES PLANUS] (ACQUIRED), RIGHT FOOT: ICD-10-CM

## 2022-09-12 DIAGNOSIS — M21.41 FLAT FOOT [PES PLANUS] (ACQUIRED), RIGHT FOOT: ICD-10-CM

## 2022-09-12 DIAGNOSIS — M25.552 PAIN IN LEFT HIP: ICD-10-CM

## 2022-09-12 PROCEDURE — 99204 OFFICE O/P NEW MOD 45 MIN: CPT

## 2022-09-12 PROCEDURE — 73502 X-RAY EXAM HIP UNI 2-3 VIEWS: CPT | Mod: LT

## 2022-09-12 NOTE — HISTORY OF PRESENT ILLNESS
[de-identified] : Patient is here for b/l foot pain and hip pain.\par \par Hip: He is an avid kick boxer; he notes pain in the hip and groin as he medial rotates his left hip and kicks. Pain is intermittent however has been occurring for years, worse in the past 2 months however also correlated with increased activity.He does also note some clicks at times. The pain is located in the groin. \par \par Feet: Reports a year of chronic foot pain in the dorsal aspect of the forefoot. He reports no history of acute trauma. Pain is worst in the morning and later on in the day it feels better. Worse with prolonged sitting or stagnant activity; improves as he continues to move his feet. Denies numbness or tingling. Better with stretching. Occurs in both feet, left worse than right.\par \par The patient's past medical history, past surgical history, medications and allergies were reviewed by me today and documented accordingly. In addition, the patient's family and social history, which were noncontributory to this visit, were reviewed also. Intake form was reviewed. The patient has no family history of arthritis.

## 2022-09-12 NOTE — PHYSICAL EXAM
[de-identified] : Constitutional: Well-nourished, well-developed, No acute distress\par Respiratory:  Good respiratory effort, no SOB\par Psychiatric: Pleasant and normal affect, alert and oriented x3\par Skin: Clean dry and intact B/L LE\par Musculoskeletal: normal except where as noted in regional exam\par \par \par Right Hip:\par \par APPEARANCE: no marked deformities, no swelling or malalignment\par POSITIVE TENDERNESS: none\par NONTENDER greater trochanter, TFL, gluteal region, ischium/proximal hamstring region, hip flexor region, sartorius and pubic symphysis. \par ROM full, painless all planes. \par RESISTIVE TESTING: painless resisted ER/IR/SLR/abduction/adduction. \par SPECIAL TESTS: painless loaded flexion & scouring. neg hop test & fulcrum test\par \par Left Hip:\par \par APPEARANCE: no marked deformities, no swelling or malalignment\par POSITIVE TENDERNESS: Hip flexor region, sartorius, proximal rectus femoris\par NONTENDER: greater trochanter, TFL, gluteal region, ischium/proximal hamstring region, and pubic symphysis. \par ROM: Limited in all planes due to pain. \par RESISTIVE TESTING: MMT 4+/5 and mild pain with hip flexion, painless resisted ER/IR/SLR/abduction/adduction. \par SPECIAL TESTS: + FADIR, neg CHRISTIANE, mild pain with loaded flexion & scouring. neg fulcrum test\par \par Bilateral feet foot:\par APPEARANCE: Pes planus, mild foot pronation.  No swelling, no marked deformities or malalignment\par POSITIVE TENDERNESS: none\par NONTENDER: 5th metatarsal base, cuboid, 1st MTP, dorsum & plantar surfaces, medial heel, mid heel. \par ROM: normal throughout foot, ankle, and digits. \par RESISTIVE TESTING: painless flex/ext, abd/add of all digits. \par SPECIAL TESTS:  neg Tinel's at tarsal tunnel. \par  [de-identified] : \par The following radiographs were ordered and read by me during this patient's visit. I reviewed each radiograph in detail with the patient and discussed the findings as highlighted below. \par \par 3 views of the left hip were obtained today that show no fracture, or dislocation. There are no degenerative changes seen. There is no malalignment.  There is a mild cam deformity noted bilaterally, no other obvious osseous abnormality. Otherwise unremarkable.

## 2022-10-07 ENCOUNTER — APPOINTMENT (OUTPATIENT)
Dept: ORTHOPEDIC SURGERY | Facility: CLINIC | Age: 27
End: 2022-10-07

## 2022-10-07 VITALS — WEIGHT: 180 LBS | HEIGHT: 71 IN | BODY MASS INDEX: 25.2 KG/M2

## 2022-10-07 DIAGNOSIS — S93.401A SPRAIN OF UNSPECIFIED LIGAMENT OF RIGHT ANKLE, INITIAL ENCOUNTER: ICD-10-CM

## 2022-10-07 PROCEDURE — 99213 OFFICE O/P EST LOW 20 MIN: CPT

## 2022-10-07 NOTE — DISCUSSION/SUMMARY
[de-identified] : Patient was seen today for evaluation management of right ankle pain after kickboxing injury.  He may direct impact with somebody else's elbow, the sudden deceleration at the ankle is equivalent to an ankle sprain.  Patient has mild tenderness of the right ATFL, he appears to have a grade 1 sprain.  Patient does not have any significant deficits on clinical exam, certainly no evidence of any instability.  Patient was given a handout of home exercise program to follow, he is also advised that he can proceed with over-the-counter ankle compression sleeve to be worn during physical activity for support.  May consider formal physical therapy if not improving.  Follow up as needed.  Patient appreciates and agrees with current plan.\par \par \par This note was generated using dragon medical dictation software.  A reasonable effort has been made for proofreading its contents, but typos may still remain.  If there are any questions or points of clarification needed please notify my office.\par

## 2022-10-07 NOTE — PHYSICAL EXAM
[de-identified] : Constitutional: Well-nourished, well-developed, No acute distress\par Respiratory:  Good respiratory effort, no SOB\par Lymphatic: No regional lymphadenopathy, no lymphedema\par Psychiatric: Pleasant and normal affect, alert and oriented x3\par Musculoskeletal: normal except where as noted in regional exam\par \par Right ankle:\par APPEARANCE: No swelling, no marked deformities or malalignment\par POSITIVE TENDERNESS: Mild tenderness of ATFL\par NONTENDER: medial malleolus, lateral malleolus, tibialis posterior tendon, achilles tendon, no marked thickening of tendon, CFL, PTFL, anterior tibiofibular ligament (high ankle), sinus tarsus, deltoid ligaments, 5th metatarsal. \par RANGE OF MOTION: full & painless. \par RESISTIVE TESTING: painless resisted dorsiflexion, plantar flexion, inversion & eversion. \par SPECIAL TESTS: neg anterior drawer. neg talar tilt. neg Kleiger's\par

## 2022-10-07 NOTE — HISTORY OF PRESENT ILLNESS
[de-identified] : Patient returns to office c/o right ankle pain. He is an avid kick boxer and says he kicked an opponent in the elbow x 2 months ago, and has continued to experience lateral right ankle pain since. Pain is improved with rest and Epsom salt baths. Denies pain with light exercise or walking, but is worsened with more strenuous activities such as putting more direct pressure on the ankle and when trying to kick again. He occasionally takes OTC Ibuprofen, which helps. He presents with x-rays of the right ankle from [facility].

## 2022-10-13 PROBLEM — I51.9 LEFT VENTRICULAR SYSTOLIC DYSFUNCTION: Status: ACTIVE | Noted: 2020-12-28

## 2023-02-15 ENCOUNTER — APPOINTMENT (OUTPATIENT)
Dept: ORTHOPEDIC SURGERY | Facility: CLINIC | Age: 28
End: 2023-02-15

## 2023-10-02 NOTE — DISCUSSION/SUMMARY
[de-identified] : Discussed findings of today's exam and possible causes of patient's pain.  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  Reviewed possible courses of treatment, and we collaboratively decided best course of treatment at this time will include conservative management.  Patient will be started on a course of physical therapy to restore normal range of motion and strength as tolerated.  The focus of therapy will be on hip mobilizations and teach the patient home exercise program.  Patient does kickboxing for many years, he is currently participating 3-4 days/week.  He is advised that this repetitive rotational activity is likely etiology of his pain.  If patient has persisting pain would recommend MRI at that time to further classify the bony morphology and cam deformity seen on x-ray today, as well as to evaluate for likely labral tearing.  A consultation with hip orthopedic surgeon thereafter would be beneficial to discuss appropriate hip preservation measures and possible surgical intervention.\par Patient was also seen today for evaluation management of chronic 1+ year of intermittent bilateral foot pain overlying the dorsum of the foot.  He has so no significant abnormal findings on clinical exam.  He likely has extensor tenosynovitis from repetitive microtrauma while participating in kickboxing.  He also has contributing factors of pes planus and mild foot pronation.  Patient will be started on a course of physical therapy to restore normal range of motion and strength as tolerated.  Recommend patient obtain OTC inserts, rec trial of superfeet insoles prior to considering custom made orthotics which may be of benefit if patient has decreased pain with the OTC insoles.\par   Follow up as needed.  Patient appreciates and agrees with current plan.\par \par I work as part of an academic orthopedic group and routinely have a physician in training (resident / fellow) working with me.  Any part of the history and physical exam performed by the physician in training was either directly reviewed and/or replicated by myself.  Any procedure performed by the physician in training was performed under my direct supervision and with the consent of the patient.\par \par This note was generated using dragon medical dictation software.  A reasonable effort has been made for proofreading its contents, but typos may still remain.  If there are any questions or points of clarification needed please notify my office.\par 
today

## 2024-03-19 ENCOUNTER — NON-APPOINTMENT (OUTPATIENT)
Age: 29
End: 2024-03-19

## 2024-03-19 ENCOUNTER — APPOINTMENT (OUTPATIENT)
Dept: INTERNAL MEDICINE | Facility: CLINIC | Age: 29
End: 2024-03-19
Payer: COMMERCIAL

## 2024-03-19 VITALS
SYSTOLIC BLOOD PRESSURE: 126 MMHG | HEIGHT: 71 IN | BODY MASS INDEX: 24.15 KG/M2 | DIASTOLIC BLOOD PRESSURE: 81 MMHG | WEIGHT: 172.5 LBS | HEART RATE: 81 BPM

## 2024-03-19 DIAGNOSIS — Z00.00 ENCOUNTER FOR GENERAL ADULT MEDICAL EXAMINATION W/OUT ABNORMAL FINDINGS: ICD-10-CM

## 2024-03-19 PROCEDURE — 36415 COLL VENOUS BLD VENIPUNCTURE: CPT

## 2024-03-19 PROCEDURE — 93000 ELECTROCARDIOGRAM COMPLETE: CPT

## 2024-03-19 PROCEDURE — 99395 PREV VISIT EST AGE 18-39: CPT

## 2024-03-19 RX ORDER — HUMAN PAPILLOMAVIRUS 9-VALENT VACCINE, RECOMBINANT 30; 40; 60; 40; 20; 20; 20; 20; 20 UG/.5ML; UG/.5ML; UG/.5ML; UG/.5ML; UG/.5ML; UG/.5ML; UG/.5ML; UG/.5ML; UG/.5ML
INJECTION, SUSPENSION INTRAMUSCULAR
Qty: 1 | Refills: 1 | Status: ACTIVE | COMMUNITY
Start: 2024-03-19 | End: 1900-01-01

## 2024-03-19 NOTE — HISTORY OF PRESENT ILLNESS
[de-identified] : 28 year old male presents for annual exam.  on doxycycline for acne, rosacea  no other acute issues   diet- home cooking mostly.  no soda, juices  exercise- weight training and cardio   single, +sexually active, declines std testing  employed- consultant  non-smoker

## 2024-03-19 NOTE — PHYSICAL EXAM
[Normal] : normal rate, regular rhythm, normal S1 and S2 and no murmur heard [No Varicosities] : no varicosities [Pedal Pulses Present] : the pedal pulses are present [No Edema] : there was no peripheral edema [No Extremity Clubbing/Cyanosis] : no extremity clubbing/cyanosis [Soft] : abdomen soft [Non Tender] : non-tender [Non-distended] : non-distended [No Masses] : no abdominal mass palpated [Normal Bowel Sounds] : normal bowel sounds [Normal Posterior Cervical Nodes] : no posterior cervical lymphadenopathy [Normal Anterior Cervical Nodes] : no anterior cervical lymphadenopathy [No CVA Tenderness] : no CVA  tenderness [No Rash] : no rash [No Focal Deficits] : no focal deficits [Normal Affect] : the affect was normal [Normal Mood] : the mood was normal

## 2024-03-19 NOTE — HEALTH RISK ASSESSMENT
[Good] : ~his/her~  mood as  good [No falls in past year] : Patient reported no falls in the past year [0] : 2) Feeling down, depressed, or hopeless: Not at all (0) [PHQ-2 Negative - No further assessment needed] : PHQ-2 Negative - No further assessment needed [Single] : single [Fully functional (using the telephone, shopping, preparing meals, housekeeping, doing laundry, using] : Fully functional and needs no help or supervision to perform IADLs (using the telephone, shopping, preparing meals, housekeeping, doing laundry, using transportation, managing medications and managing finances) [Fully functional (bathing, dressing, toileting, transferring, walking, feeding)] : Fully functional (bathing, dressing, toileting, transferring, walking, feeding) [[id number]] : 0 [Change in mental status noted] : No change in mental status noted [Sexually Active] : not sexually active [Reports changes in hearing] : Reports no changes in hearing [Reports changes in vision] : Reports no changes in vision

## 2024-03-19 NOTE — ASSESSMENT
[FreeTextEntry1] : //  h/o GERD on omeprazole since last year.  follows with GI,  s/p EGD and colonoscopy which were normal.  -better  -cont diet modifications   Healthcare Maintenance -Advise Yearly Skin cancer screening with Dermatologist  -Advise Yearly Eye exam with Ophthalmologist -Advise Yearly Dental exam -Educated of the importance of Healthy diet, such as Mediterranean Diet and Exercise, such as walking >20 minutes a day and increasing gradually as tolerated  Immunizations -Flu vaccine  -Covid vaccine  -HPV- sent to pharmacy

## 2024-03-20 ENCOUNTER — CLINICAL ADVICE (OUTPATIENT)
Age: 29
End: 2024-03-20

## 2024-03-20 LAB
ALBUMIN SERPL ELPH-MCNC: 4.9 G/DL
ALP BLD-CCNC: 72 U/L
ALT SERPL-CCNC: 29 U/L
ANION GAP SERPL CALC-SCNC: 11 MMOL/L
APPEARANCE: CLEAR
AST SERPL-CCNC: 24 U/L
BACTERIA: NEGATIVE /HPF
BASOPHILS # BLD AUTO: 0.04 K/UL
BASOPHILS NFR BLD AUTO: 0.6 %
BILIRUB DIRECT SERPL-MCNC: 0.1 MG/DL
BILIRUB INDIRECT SERPL-MCNC: 0.3 MG/DL
BILIRUB SERPL-MCNC: 0.4 MG/DL
BILIRUBIN URINE: NEGATIVE
BLOOD URINE: NEGATIVE
BUN SERPL-MCNC: 22 MG/DL
CALCIUM SERPL-MCNC: 9.9 MG/DL
CAST: 0 /LPF
CHLORIDE SERPL-SCNC: 103 MMOL/L
CHOLEST SERPL-MCNC: 187 MG/DL
CO2 SERPL-SCNC: 24 MMOL/L
COLOR: YELLOW
CREAT SERPL-MCNC: 1.08 MG/DL
EGFR: 97 ML/MIN/1.73M2
EOSINOPHIL # BLD AUTO: 0.34 K/UL
EOSINOPHIL NFR BLD AUTO: 4.9 %
EPITHELIAL CELLS: 0 /HPF
ESTIMATED AVERAGE GLUCOSE: 108 MG/DL
FERRITIN SERPL-MCNC: 140 NG/ML
FOLATE SERPL-MCNC: 12.8 NG/ML
GLUCOSE QUALITATIVE U: NEGATIVE MG/DL
GLUCOSE SERPL-MCNC: 91 MG/DL
HBA1C MFR BLD HPLC: 5.4 %
HCT VFR BLD CALC: 51.8 %
HDLC SERPL-MCNC: 45 MG/DL
HGB BLD-MCNC: 16.9 G/DL
IMM GRANULOCYTES NFR BLD AUTO: 0.3 %
IRON SATN MFR SERPL: 18 %
IRON SERPL-MCNC: 64 UG/DL
KETONES URINE: NEGATIVE MG/DL
LDLC SERPL CALC-MCNC: 116 MG/DL
LEUKOCYTE ESTERASE URINE: NEGATIVE
LYMPHOCYTES # BLD AUTO: 2.05 K/UL
LYMPHOCYTES NFR BLD AUTO: 29.8 %
MAN DIFF?: NORMAL
MCHC RBC-ENTMCNC: 27.2 PG
MCHC RBC-ENTMCNC: 32.6 GM/DL
MCV RBC AUTO: 83.3 FL
MICROSCOPIC-UA: NORMAL
MONOCYTES # BLD AUTO: 0.59 K/UL
MONOCYTES NFR BLD AUTO: 8.6 %
NEUTROPHILS # BLD AUTO: 3.84 K/UL
NEUTROPHILS NFR BLD AUTO: 55.8 %
NITRITE URINE: NEGATIVE
NONHDLC SERPL-MCNC: 142 MG/DL
PH URINE: 5.5
PLATELET # BLD AUTO: 225 K/UL
POTASSIUM SERPL-SCNC: 4.2 MMOL/L
PROT SERPL-MCNC: 7.4 G/DL
PROTEIN URINE: NEGATIVE MG/DL
RBC # BLD: 6.22 M/UL
RBC # FLD: 13.4 %
RED BLOOD CELLS URINE: 0 /HPF
SODIUM SERPL-SCNC: 139 MMOL/L
SPECIFIC GRAVITY URINE: 1.03
TIBC SERPL-MCNC: 356 UG/DL
TRIGL SERPL-MCNC: 147 MG/DL
TSH SERPL-ACNC: 2.32 UIU/ML
UIBC SERPL-MCNC: 292 UG/DL
UROBILINOGEN URINE: 0.2 MG/DL
VIT B12 SERPL-MCNC: 445 PG/ML
WBC # FLD AUTO: 6.88 K/UL
WHITE BLOOD CELLS URINE: 0 /HPF

## 2024-03-24 ENCOUNTER — EMERGENCY (EMERGENCY)
Facility: HOSPITAL | Age: 29
LOS: 1 days | Discharge: HOME | End: 2024-03-24
Attending: EMERGENCY MEDICINE
Payer: COMMERCIAL

## 2024-03-24 VITALS
RESPIRATION RATE: 20 BRPM | WEIGHT: 171.96 LBS | HEART RATE: 103 BPM | TEMPERATURE: 101 F | HEIGHT: 71 IN | OXYGEN SATURATION: 97 % | SYSTOLIC BLOOD PRESSURE: 124 MMHG | DIASTOLIC BLOOD PRESSURE: 82 MMHG

## 2024-03-24 DIAGNOSIS — K46.9 UNSPECIFIED ABDOMINAL HERNIA WITHOUT OBSTRUCTION OR GANGRENE: Chronic | ICD-10-CM

## 2024-03-24 LAB
ALBUMIN SERPL ELPH-MCNC: 4.8 G/DL — SIGNIFICANT CHANGE UP (ref 3.3–5)
ALP SERPL-CCNC: 68 U/L — SIGNIFICANT CHANGE UP (ref 40–120)
ALT FLD-CCNC: 35 U/L — SIGNIFICANT CHANGE UP (ref 10–45)
ANION GAP SERPL CALC-SCNC: 16 MMOL/L — SIGNIFICANT CHANGE UP (ref 5–17)
AST SERPL-CCNC: 29 U/L — SIGNIFICANT CHANGE UP (ref 10–40)
BASOPHILS # BLD AUTO: 0 K/UL — SIGNIFICANT CHANGE UP (ref 0–0.2)
BASOPHILS NFR BLD AUTO: 0 % — SIGNIFICANT CHANGE UP (ref 0–2)
BILIRUB SERPL-MCNC: 0.7 MG/DL — SIGNIFICANT CHANGE UP (ref 0.2–1.2)
BUN SERPL-MCNC: 17 MG/DL — SIGNIFICANT CHANGE UP (ref 7–23)
CALCIUM SERPL-MCNC: 9.5 MG/DL — SIGNIFICANT CHANGE UP (ref 8.4–10.5)
CHLORIDE SERPL-SCNC: 103 MMOL/L — SIGNIFICANT CHANGE UP (ref 96–108)
CO2 SERPL-SCNC: 20 MMOL/L — LOW (ref 22–31)
CREAT SERPL-MCNC: 1.07 MG/DL — SIGNIFICANT CHANGE UP (ref 0.5–1.3)
EGFR: 98 ML/MIN/1.73M2 — SIGNIFICANT CHANGE UP
EOSINOPHIL # BLD AUTO: 0.21 K/UL — SIGNIFICANT CHANGE UP (ref 0–0.5)
EOSINOPHIL NFR BLD AUTO: 2 % — SIGNIFICANT CHANGE UP (ref 0–6)
GLUCOSE SERPL-MCNC: 101 MG/DL — HIGH (ref 70–99)
HCT VFR BLD CALC: 51.7 % — HIGH (ref 39–50)
HGB BLD-MCNC: 17.4 G/DL — HIGH (ref 13–17)
LIDOCAIN IGE QN: 20 U/L — SIGNIFICANT CHANGE UP (ref 7–60)
LYMPHOCYTES # BLD AUTO: 0.32 K/UL — LOW (ref 1–3.3)
LYMPHOCYTES # BLD AUTO: 3 % — LOW (ref 13–44)
MAGNESIUM SERPL-MCNC: 1.6 MG/DL — SIGNIFICANT CHANGE UP (ref 1.6–2.6)
MANUAL SMEAR VERIFICATION: SIGNIFICANT CHANGE UP
MCHC RBC-ENTMCNC: 28 PG — SIGNIFICANT CHANGE UP (ref 27–34)
MCHC RBC-ENTMCNC: 33.7 GM/DL — SIGNIFICANT CHANGE UP (ref 32–36)
MCV RBC AUTO: 83.3 FL — SIGNIFICANT CHANGE UP (ref 80–100)
MONOCYTES # BLD AUTO: 0.21 K/UL — SIGNIFICANT CHANGE UP (ref 0–0.9)
MONOCYTES NFR BLD AUTO: 2 % — SIGNIFICANT CHANGE UP (ref 2–14)
NEUTROPHILS # BLD AUTO: 9.78 K/UL — HIGH (ref 1.8–7.4)
NEUTROPHILS NFR BLD AUTO: 89 % — HIGH (ref 43–77)
NEUTS BAND # BLD: 4 % — SIGNIFICANT CHANGE UP (ref 0–8)
NRBC # BLD: 0 /100 WBCS — SIGNIFICANT CHANGE UP (ref 0–0)
PLAT MORPH BLD: NORMAL — SIGNIFICANT CHANGE UP
PLATELET # BLD AUTO: 181 K/UL — SIGNIFICANT CHANGE UP (ref 150–400)
POTASSIUM SERPL-MCNC: 4.5 MMOL/L — SIGNIFICANT CHANGE UP (ref 3.5–5.3)
POTASSIUM SERPL-SCNC: 4.5 MMOL/L — SIGNIFICANT CHANGE UP (ref 3.5–5.3)
PROT SERPL-MCNC: 7.8 G/DL — SIGNIFICANT CHANGE UP (ref 6–8.3)
RBC # BLD: 6.21 M/UL — HIGH (ref 4.2–5.8)
RBC # FLD: 13.2 % — SIGNIFICANT CHANGE UP (ref 10.3–14.5)
RBC BLD AUTO: NORMAL — SIGNIFICANT CHANGE UP
SODIUM SERPL-SCNC: 139 MMOL/L — SIGNIFICANT CHANGE UP (ref 135–145)
WBC # BLD: 10.52 K/UL — HIGH (ref 3.8–10.5)
WBC # FLD AUTO: 10.52 K/UL — HIGH (ref 3.8–10.5)

## 2024-03-24 PROCEDURE — 96375 TX/PRO/DX INJ NEW DRUG ADDON: CPT

## 2024-03-24 PROCEDURE — 83735 ASSAY OF MAGNESIUM: CPT

## 2024-03-24 PROCEDURE — 87637 SARSCOV2&INF A&B&RSV AMP PRB: CPT

## 2024-03-24 PROCEDURE — 85025 COMPLETE CBC W/AUTO DIFF WBC: CPT

## 2024-03-24 PROCEDURE — 99284 EMERGENCY DEPT VISIT MOD MDM: CPT | Mod: 25

## 2024-03-24 PROCEDURE — 80053 COMPREHEN METABOLIC PANEL: CPT

## 2024-03-24 PROCEDURE — 83690 ASSAY OF LIPASE: CPT

## 2024-03-24 PROCEDURE — 96374 THER/PROPH/DIAG INJ IV PUSH: CPT

## 2024-03-24 PROCEDURE — 36415 COLL VENOUS BLD VENIPUNCTURE: CPT

## 2024-03-24 PROCEDURE — 99284 EMERGENCY DEPT VISIT MOD MDM: CPT

## 2024-03-24 RX ORDER — SODIUM CHLORIDE 9 MG/ML
1000 INJECTION INTRAMUSCULAR; INTRAVENOUS; SUBCUTANEOUS ONCE
Refills: 0 | Status: COMPLETED | OUTPATIENT
Start: 2024-03-24 | End: 2024-03-24

## 2024-03-24 RX ORDER — FAMOTIDINE 10 MG/ML
20 INJECTION INTRAVENOUS ONCE
Refills: 0 | Status: COMPLETED | OUTPATIENT
Start: 2024-03-24 | End: 2024-03-24

## 2024-03-24 RX ORDER — KETOROLAC TROMETHAMINE 30 MG/ML
15 SYRINGE (ML) INJECTION ONCE
Refills: 0 | Status: DISCONTINUED | OUTPATIENT
Start: 2024-03-24 | End: 2024-03-24

## 2024-03-24 RX ORDER — ACETAMINOPHEN 500 MG
1000 TABLET ORAL ONCE
Refills: 0 | Status: COMPLETED | OUTPATIENT
Start: 2024-03-24 | End: 2024-03-24

## 2024-03-24 RX ORDER — ONDANSETRON 8 MG/1
4 TABLET, FILM COATED ORAL ONCE
Refills: 0 | Status: COMPLETED | OUTPATIENT
Start: 2024-03-24 | End: 2024-03-24

## 2024-03-24 RX ADMIN — Medication 15 MILLIGRAM(S): at 23:13

## 2024-03-24 RX ADMIN — ONDANSETRON 4 MILLIGRAM(S): 8 TABLET, FILM COATED ORAL at 22:00

## 2024-03-24 RX ADMIN — Medication 400 MILLIGRAM(S): at 22:19

## 2024-03-24 RX ADMIN — SODIUM CHLORIDE 1000 MILLILITER(S): 9 INJECTION INTRAMUSCULAR; INTRAVENOUS; SUBCUTANEOUS at 23:14

## 2024-03-24 RX ADMIN — SODIUM CHLORIDE 1000 MILLILITER(S): 9 INJECTION INTRAMUSCULAR; INTRAVENOUS; SUBCUTANEOUS at 22:00

## 2024-03-24 RX ADMIN — FAMOTIDINE 20 MILLIGRAM(S): 10 INJECTION INTRAVENOUS at 22:00

## 2024-03-24 NOTE — ED ADULT NURSE NOTE - OBJECTIVE STATEMENT
29 y/o M with PMH of acute pericarditis, gastritis, presents to the Ed with complaints of V/D/abd pain. pt states that he just arrived back from [place] for a convention and states that abd pain started last night, and was unable to pass gas since this morning. pt denies sob, urinary symptoms, hematuria.   Pt A&Ox3 gross neuro intact, no difficulty speaking in complete sentences, pulses x 4, hartman x4, skin intact. iv 20g placed In RAC  covering rn

## 2024-03-24 NOTE — ED PROVIDER NOTE - CLINICAL SUMMARY MEDICAL DECISION MAKING FREE TEXT BOX
Kimber: Patient with n/v/d, not able tot olerate PO. febrile here. not vaccinated for flu. traveling back from [place]. no cp, no sob. + heartburn. PE: att exam: patient awake alert, dry mm, tonsils not enlarged, no exudates,  LUNGS CTAB no wheeze no crackle. CARD RRR no m/r/g.  Abdomen soft NT ND no rebound no guarding no CVA tenderness. EXT WWP no edema no calf tenderness CV 2+DP/PT bilaterally. neuro A&Ox3, no focal deficits.   plan: will get labs, iv, gi cocktail, r/o flu versus gastro, abdomen soft benign, lower likelihood of janine/pancreatitis/appe.   reassess

## 2024-03-24 NOTE — ED PROVIDER NOTE - PATIENT PORTAL LINK FT
You can access the FollowMyHealth Patient Portal offered by [facility] by registering at the following website: [email or web address]. By joining [facility]’s FollowMyHealth portal, you will also be able to view your health information using other applications (apps) compatible with our system.

## 2024-03-24 NOTE — ED ADULT NURSE NOTE - NSFALLUNIVINTERV_ED_ALL_ED
Bed/Stretcher in lowest position, wheels locked, appropriate side rails in place/Call bell, personal items and telephone in reach/Instruct patient to call for assistance before getting out of bed/chair/stretcher/Non-slip footwear applied when patient is off stretcher/[place] to call system/Physically safe environment - no spills, clutter or unnecessary equipment/Purposeful proactive rounding/Room/bathroom lighting operational, light cord in reach

## 2024-03-24 NOTE — ED ADULT NURSE NOTE - CHPI ED NUR SYMPTOMS POS
Addended by: MIKE DAVID on: 8/7/2019 09:48 AM     Modules accepted: Orders    
DECREASED EATING/DRINKING/DIARRHEA/FEVER/NAUSEA/PAIN/VOMITING

## 2024-03-24 NOTE — ED PROVIDER NOTE - OBJECTIVE STATEMENT
28 year old male with pmhx chronic pericarditis, here with n/v/d x 1 day. started this am while driving back from [place]. was at convention with a lot of people. reports some abdominal discomfort last night. no known fever until arrived here. vomiting en route, and not able to keep anything down. no rash, no outside travel, no cp, no sob. reporting some heartburn.

## 2024-03-24 NOTE — ED PROVIDER NOTE - NSFOLLOWUPINSTRUCTIONS_ED_ALL_ED_FT
You were seen in the Emergency Department for nausea and vomiting. Lab and imaging results, if performed, were discussed with you along with your discharge diagnosis.    Follow up with your doctor in 1 week - bring copies of your results if you were given. If you do not have a primary doctor, please call [phone] to find one convenient for you.    Continue all prescribed medications.     To control your pain at home, you should take Ibuprofen 400 mg along with Tylenol 650mg-1000mg every 6 to 8 hours. Limit your maximum daily Tylenol from all sources to 4000mg. Be aware that many other medications contain acetaminophen which is also known as Tylenol. Taking Tylenol and Ibuprofen together has been shown to be more effective at relieving pain than taking them separately. These are both over the counter medications that you can  at your local pharmacy without a prescription. You need to respect all of the warnings on the bottles. You shouldn’t take these medications for more than a week without following up with your doctor. Both medications come with certain risks and side effects that you need to discuss with your doctor, especially if you are taking them for a prolonged period.    Return to ED for any new or worsening symptoms including but not limited to: development of chest pain, shortness of breath, fever, vomiting, focal numbness, weakness or tingling, any severe CP, headache, abdominal pain, back pain.      Rest and keep yourself hydrated with fluids.

## 2024-03-25 VITALS
RESPIRATION RATE: 17 BRPM | HEART RATE: 98 BPM | DIASTOLIC BLOOD PRESSURE: 45 MMHG | OXYGEN SATURATION: 98 % | SYSTOLIC BLOOD PRESSURE: 105 MMHG

## 2024-03-25 LAB
FLUAV AG NPH QL: SIGNIFICANT CHANGE UP
FLUBV AG NPH QL: SIGNIFICANT CHANGE UP
RSV RNA NPH QL NAA+NON-PROBE: SIGNIFICANT CHANGE UP
SARS-COV-2 RNA SPEC QL NAA+PROBE: SIGNIFICANT CHANGE UP

## 2024-03-25 RX ORDER — ONDANSETRON 8 MG/1
1 TABLET, FILM COATED ORAL
Qty: 9 | Refills: 0
Start: 2024-03-25 | End: 2024-03-27

## 2024-03-26 ENCOUNTER — TRANSCRIPTION ENCOUNTER (OUTPATIENT)
Age: 29
End: 2024-03-26

## 2024-09-17 ENCOUNTER — APPOINTMENT (OUTPATIENT)
Dept: INTERNAL MEDICINE | Facility: CLINIC | Age: 29
End: 2024-09-17

## 2025-01-29 DIAGNOSIS — Z11.3 ENCOUNTER FOR SCREENING FOR INFECTIONS WITH A PREDOMINANTLY SEXUAL MODE OF TRANSMISSION: ICD-10-CM
